# Patient Record
Sex: MALE | Race: WHITE
[De-identification: names, ages, dates, MRNs, and addresses within clinical notes are randomized per-mention and may not be internally consistent; named-entity substitution may affect disease eponyms.]

---

## 2019-01-17 ENCOUNTER — HOSPITAL ENCOUNTER (EMERGENCY)
Dept: HOSPITAL 33 - ED | Age: 49
Discharge: HOME | End: 2019-01-17
Payer: COMMERCIAL

## 2019-01-17 VITALS — SYSTOLIC BLOOD PRESSURE: 146 MMHG | DIASTOLIC BLOOD PRESSURE: 93 MMHG

## 2019-01-17 VITALS — HEART RATE: 112 BPM | OXYGEN SATURATION: 97 %

## 2019-01-17 DIAGNOSIS — M25.562: ICD-10-CM
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
## 2019-01-17 DIAGNOSIS — R51: ICD-10-CM
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
## 2019-01-17 DIAGNOSIS — K21.9: ICD-10-CM

## 2019-01-17 DIAGNOSIS — M54.5: ICD-10-CM

## 2019-01-17 DIAGNOSIS — S01.01XA: Primary | ICD-10-CM

## 2019-01-17 DIAGNOSIS — I10: ICD-10-CM

## 2019-01-17 PROCEDURE — 70450 CT HEAD/BRAIN W/O DYE: CPT

## 2019-01-17 PROCEDURE — 73562 X-RAY EXAM OF KNEE 3: CPT

## 2019-01-17 PROCEDURE — 99284 EMERGENCY DEPT VISIT MOD MDM: CPT

## 2019-01-17 PROCEDURE — 72131 CT LUMBAR SPINE W/O DYE: CPT

## 2019-01-17 PROCEDURE — 12001 RPR S/N/AX/GEN/TRNK 2.5CM/<: CPT
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
